# Patient Record
Sex: FEMALE
[De-identification: names, ages, dates, MRNs, and addresses within clinical notes are randomized per-mention and may not be internally consistent; named-entity substitution may affect disease eponyms.]

---

## 2019-03-21 ENCOUNTER — HOSPITAL ENCOUNTER (EMERGENCY)
Dept: HOSPITAL 31 - C.ER | Age: 49
LOS: 1 days | Discharge: HOME | End: 2019-03-22
Payer: MEDICAID

## 2019-03-21 VITALS — RESPIRATION RATE: 18 BRPM

## 2019-03-21 DIAGNOSIS — R10.32: Primary | ICD-10-CM

## 2019-03-21 PROCEDURE — 80358 DRUG SCREENING METHADONE: CPT

## 2019-03-21 PROCEDURE — 80353 DRUG SCREENING COCAINE: CPT

## 2019-03-21 PROCEDURE — 81025 URINE PREGNANCY TEST: CPT

## 2019-03-21 PROCEDURE — 81001 URINALYSIS AUTO W/SCOPE: CPT

## 2019-03-21 PROCEDURE — 85610 PROTHROMBIN TIME: CPT

## 2019-03-21 PROCEDURE — 85730 THROMBOPLASTIN TIME PARTIAL: CPT

## 2019-03-21 PROCEDURE — 80349 CANNABINOIDS NATURAL: CPT

## 2019-03-21 PROCEDURE — 85025 COMPLETE CBC W/AUTO DIFF WBC: CPT

## 2019-03-21 PROCEDURE — 74177 CT ABD & PELVIS W/CONTRAST: CPT

## 2019-03-21 PROCEDURE — 80346 BENZODIAZEPINES1-12: CPT

## 2019-03-21 PROCEDURE — 80345 DRUG SCREENING BARBITURATES: CPT

## 2019-03-21 PROCEDURE — 83992 ASSAY FOR PHENCYCLIDINE: CPT

## 2019-03-21 PROCEDURE — 99283 EMERGENCY DEPT VISIT LOW MDM: CPT

## 2019-03-21 PROCEDURE — 80053 COMPREHEN METABOLIC PANEL: CPT

## 2019-03-21 PROCEDURE — 80361 OPIATES 1 OR MORE: CPT

## 2019-03-21 PROCEDURE — 96374 THER/PROPH/DIAG INJ IV PUSH: CPT

## 2019-03-21 PROCEDURE — 84703 CHORIONIC GONADOTROPIN ASSAY: CPT

## 2019-03-21 PROCEDURE — 80324 DRUG SCREEN AMPHETAMINES 1/2: CPT

## 2019-03-22 ENCOUNTER — HOSPITAL ENCOUNTER (EMERGENCY)
Dept: HOSPITAL 14 - H.ER | Age: 49
Discharge: HOME | End: 2019-03-22
Payer: MEDICAID

## 2019-03-22 VITALS
SYSTOLIC BLOOD PRESSURE: 114 MMHG | OXYGEN SATURATION: 98 % | TEMPERATURE: 97.6 F | DIASTOLIC BLOOD PRESSURE: 70 MMHG | HEART RATE: 73 BPM

## 2019-03-22 VITALS
HEART RATE: 83 BPM | RESPIRATION RATE: 15 BRPM | SYSTOLIC BLOOD PRESSURE: 139 MMHG | DIASTOLIC BLOOD PRESSURE: 82 MMHG | OXYGEN SATURATION: 100 %

## 2019-03-22 VITALS — TEMPERATURE: 98.4 F

## 2019-03-22 DIAGNOSIS — Z88.1: ICD-10-CM

## 2019-03-22 DIAGNOSIS — I10: ICD-10-CM

## 2019-03-22 DIAGNOSIS — Z88.5: ICD-10-CM

## 2019-03-22 DIAGNOSIS — M79.81: Primary | ICD-10-CM

## 2019-03-22 DIAGNOSIS — N83.201: ICD-10-CM

## 2019-03-22 DIAGNOSIS — E87.6: ICD-10-CM

## 2019-03-22 DIAGNOSIS — Z88.8: ICD-10-CM

## 2019-03-22 LAB
ALBUMIN SERPL-MCNC: 4.4 G/DL (ref 3.5–5)
ALBUMIN SERPL-MCNC: 4.4 G/DL (ref 3.5–5)
ALBUMIN/GLOB SERPL: 1.5 {RATIO} (ref 1–2.1)
ALBUMIN/GLOB SERPL: 1.8 {RATIO} (ref 1–2.1)
ALT SERPL-CCNC: 17 U/L (ref 9–52)
ALT SERPL-CCNC: 30 U/L (ref 9–52)
APTT BLD: 29 SECONDS (ref 21–34)
APTT BLD: 34.4 SECONDS (ref 25.6–37.1)
AST SERPL-CCNC: 27 U/L (ref 14–36)
AST SERPL-CCNC: 27 U/L (ref 14–36)
BACTERIA #/AREA URNS HPF: (no result) /[HPF]
BASOPHILS # BLD AUTO: 0.1 K/UL (ref 0–0.2)
BASOPHILS # BLD AUTO: 0.1 K/UL (ref 0–0.2)
BASOPHILS NFR BLD: 0.7 % (ref 0–2)
BASOPHILS NFR BLD: 1 % (ref 0–2)
BILIRUB UR-MCNC: NEGATIVE MG/DL
BUN SERPL-MCNC: 11 MG/DL (ref 7–17)
BUN SERPL-MCNC: 19 MG/DL (ref 7–17)
CALCIUM SERPL-MCNC: 9.5 MG/DL (ref 8.4–10.2)
CALCIUM SERPL-MCNC: 9.5 MG/DL (ref 8.6–10.4)
EOSINOPHIL # BLD AUTO: 0 K/UL (ref 0–0.7)
EOSINOPHIL # BLD AUTO: 0.1 K/UL (ref 0–0.7)
EOSINOPHIL NFR BLD: 0.2 % (ref 0–4)
EOSINOPHIL NFR BLD: 1 % (ref 0–4)
ERYTHROCYTE [DISTWIDTH] IN BLOOD BY AUTOMATED COUNT: 13.9 % (ref 11.5–14.5)
ERYTHROCYTE [DISTWIDTH] IN BLOOD BY AUTOMATED COUNT: 14 % (ref 11.5–14.5)
GFR NON-AFRICAN AMERICAN: > 60
GFR NON-AFRICAN AMERICAN: > 60
GLUCOSE UR STRIP-MCNC: NORMAL MG/DL
HCG,QUALITATIVE URINE: NEGATIVE
HGB BLD-MCNC: 12.5 G/DL (ref 12–16)
HGB BLD-MCNC: 13 G/DL (ref 11–16)
HYALINE CASTS #/AREA URNS LPF: (no result) /LPF (ref 0–2)
INR PPP: 1
INR PPP: 1.1
LEUKOCYTE ESTERASE UR-ACNC: (no result) LEU/UL
LYMPHOCYTES # BLD AUTO: 2.1 K/UL (ref 1–4.3)
LYMPHOCYTES # BLD AUTO: 2.6 K/UL (ref 1–4.3)
LYMPHOCYTES NFR BLD AUTO: 23.1 % (ref 20–40)
LYMPHOCYTES NFR BLD AUTO: 23.9 % (ref 20–40)
MCH RBC QN AUTO: 30.6 PG (ref 27–31)
MCH RBC QN AUTO: 31.4 PG (ref 27–31)
MCHC RBC AUTO-ENTMCNC: 33.7 G/DL (ref 33–37)
MCHC RBC AUTO-ENTMCNC: 34.2 G/DL (ref 33–37)
MCV RBC AUTO: 90.8 FL (ref 81–99)
MCV RBC AUTO: 91.7 FL (ref 81–99)
MONOCYTES # BLD: 0.6 K/UL (ref 0–0.8)
MONOCYTES # BLD: 0.7 K/UL (ref 0–0.8)
MONOCYTES NFR BLD: 6.6 % (ref 0–10)
MONOCYTES NFR BLD: 6.8 % (ref 0–10)
NEUTROPHILS # BLD: 6.3 K/UL (ref 1.8–7)
NEUTROPHILS # BLD: 7.4 K/UL (ref 1.8–7)
NEUTROPHILS NFR BLD AUTO: 67.8 % (ref 50–75)
NEUTROPHILS NFR BLD AUTO: 68.9 % (ref 50–75)
NRBC BLD AUTO-RTO: 0 % (ref 0–0)
NRBC BLD AUTO-RTO: 0 % (ref 0–2)
PH UR STRIP: 6 [PH] (ref 5–8)
PLATELET # BLD: 242 K/UL (ref 130–400)
PLATELET # BLD: 258 K/UL (ref 130–400)
PMV BLD AUTO: 9.3 FL (ref 7.2–11.7)
PMV BLD AUTO: 9.7 FL (ref 7.2–11.7)
PROT UR STRIP-MCNC: NEGATIVE MG/DL
PROTHROMBIN TIME: 11.7 SECONDS (ref 9.8–13.1)
PROTHROMBIN TIME: 12 SECONDS (ref 9.7–12.2)
RBC # BLD AUTO: 4.09 MIL/UL (ref 3.8–5.2)
RBC # BLD AUTO: 4.13 MIL/UL (ref 3.8–5.2)
RBC # UR STRIP: NEGATIVE /UL
SP GR UR STRIP: 1.01 (ref 1–1.03)
SQUAMOUS EPITHIAL: 1 /HPF (ref 0–5)
UROBILINOGEN UR-MCNC: NORMAL MG/DL (ref 0.2–1)
WBC # BLD AUTO: 10.9 K/UL (ref 4.8–10.8)
WBC # BLD AUTO: 9.2 K/UL (ref 4.8–10.8)

## 2019-03-22 PROCEDURE — 85610 PROTHROMBIN TIME: CPT

## 2019-03-22 PROCEDURE — 76856 US EXAM PELVIC COMPLETE: CPT

## 2019-03-22 PROCEDURE — 81025 URINE PREGNANCY TEST: CPT

## 2019-03-22 PROCEDURE — 85730 THROMBOPLASTIN TIME PARTIAL: CPT

## 2019-03-22 PROCEDURE — 76882 US LMTD JT/FCL EVL NVASC XTR: CPT

## 2019-03-22 PROCEDURE — 80053 COMPREHEN METABOLIC PANEL: CPT

## 2019-03-22 PROCEDURE — 96374 THER/PROPH/DIAG INJ IV PUSH: CPT

## 2019-03-22 PROCEDURE — 99284 EMERGENCY DEPT VISIT MOD MDM: CPT

## 2019-03-22 PROCEDURE — 85025 COMPLETE CBC W/AUTO DIFF WBC: CPT

## 2019-03-22 NOTE — CT
Date of service: 



03/22/2019



PROCEDURE:  CT Abdomen and Pelvis with contrast



HISTORY:

LLQ PAIN and swelling r/o hernia



COMPARISON:

None available.



TECHNIQUE:

CT scan of the abdomen and pelvis was performed after administration 

of intravenous contrast. Oral contrast was administered.  Coronal and 

sagittal reformatted images were obtained.



Contrast dose: 100 mL Visipaque 320 



Radiation dose:



Total exam DLP = 848.47 mGy-cm.



This CT exam was performed using one or more of the following dose 

reduction techniques: Automated exposure control, adjustment of the 

mA and/or kV according to patient size, and/or use of iterative 

reconstruction technique.



FINDINGS:



LOWER THORAX:

The visualized lungs are clear. 



LIVER:

Mild hepatomegaly and fatty liver.  Normal homogeneous enhancement.  

No gross lesion or ductal dilatation. 



GALLBLADDER AND BILE DUCTS:

Surgically absent. 



PANCREAS:

Normal in size with homogeneous enhancement.  No gross lesion or 

ductal dilatation.



SPLEEN:

Normal in size and appearance. 



ADRENALS:

No discrete nodule. 



KIDNEYS AND URETERS:

Normal in size with homogeneous enhancement.  No hydronephrosis. No 

solid mass. 



VASCULATURE:

No aortic aneurysm.  There are no aortic atherosclerotic 

calcifications or mural plaque present. 



BOWEL:

The small bowel loops are normal in caliber.  Mild sigmoid 

diverticulosis without CT evidence for acute diverticulitis.  No 

bowel wall thickening or obstruction. 



APPENDIX:

Normal appendix. 



PERITONEUM:

No free fluid. No free air. 



LYMPH NODES:

No enlarged lymph nodes. 



BLADDER:

Partially decompressed. 



REPRODUCTIVE:

The uterus is normal in size. 



BONES:

No acute fracture.  Within normal limits for the patient's age. 



OTHER FINDINGS:

Mild subcutaneous edema/contusion in the left lower abdominal wall.



IMPRESSION:

No acute abdominal or pelvic abnormality.



Mild hepatomegaly and fatty liver. 



Mild sigmoid diverticulosis without CT evidence for acute 

diverticulitis. 



A preliminary report was provided by [employer].

## 2019-03-22 NOTE — C.PDOC
History Of Present Illness


49 year old female with PMHx of HTN presents to the ED c/o sudden onset left 

groin pain while showering tonight. Patient reports pain is associated with 

swelling and discoloration of the groin. Patient reports her LMP was last week a

nd it was normal. Patient has previous surgical history of 2 C-sections and 

cholecystectomy. Patient denies fever, chills, injury, heavy lifting, history of

the same, nausea, vomit, diarrhea, dysuria, hematuria, vaginal bleeding, vaginal

discharge.





Social history : smoker


Family history : HTN








Time Seen by Provider: 19 23:22


Chief Complaint (Nursing): Groin Pain


History Per: Patient


History/Exam Limitations: no limitations


Onset/Duration Of Symptoms: Sudden Onset


Current Symptoms Are (Timing): Still Present


Severity: Moderate


Location Of Pain/Discomfort: Other (Pelvic)


Radiation Of Pain To:: None


Quality Of Discomfort: "Pain"


Associated Symptoms: denies: Nausea, Vomiting, Diarrhea, Loss Of Appetite, 

Constipation, Urinary Symptoms


Recent travel outside of the [place]: No


Additional History Per: Patient


Abnormal Vaginal Bleeding: No


Last Menstral Period: last week





Past Medical History


Reviewed: Historical Data, Nursing Documentation, Vital Signs


Vital Signs: 





                                Last Vital Signs











Temp  98.4 F   19 23:22


 


Pulse  109 H  19 23:22


 


Resp  18   19 23:22


 


BP  156/95 H  19 23:22


 


Pulse Ox  99   19 23:22














- Medical History


PMH: Asthma, Gall Bladder Disease, HTN


   Denies: Chronic Kidney Disease


Surgical History: Cholecystectomy, 


Family History: States: Unknown Family Hx





- Social History


Hx Alcohol Use: No


Hx Substance Use: Yes





Review Of Systems


Constitutional: Negative for: Fever, Chills


Cardiovascular: Negative for: Chest Pain


Respiratory: Negative for: Shortness of Breath


Gastrointestinal: Negative for: Nausea, Vomiting, Abdominal Pain


Genitourinary: Positive for: Pelvic Pain.  Negative for: Dysuria, Hematuria, 

Vaginal Discharge, Vaginal Bleeding


Skin: Negative for: Rash


Neurological: Negative for: Weakness, Numbness





Physical Exam





- Physical Exam


Appears: Non-toxic, In Acute Distress (painful distress)


Skin: Warm, Dry


Head: Atraumatic, Normacephalic


Eye(s): bilateral: PERRL, EOMI


Oral Mucosa: Moist


Throat: No Erythema, No Exudate


Chest: Symmetrical, No Tenderness


Cardiovascular: Rhythm Regular, No Murmur


Respiratory: Normal Breath Sounds, No Wheezing


Gastrointestinal/Abdominal: Soft, Tenderness (left pelvic area raised 

ecchymotic, purpuric with exquisite tenderness with palpation at the site), No 

Distention, No Guarding, No Rebound, Other (obese)


Back: Normal Inspection, No Decreased ROM


Extremity: Normal ROM, No Deformity


Neurological/Psych: Oriented x3, Normal Motor, Normal Sensation





ED Course And Treatment





- Laboratory Results


Result Diagrams: 


                                 19 00:00





                                 19 00:00


O2 Sat by Pulse Oximetry: 99 (ON RA)


Pulse Ox Interpretation: Normal





Medical Decision Making


Medical Decision Making: 


Impression: acute left groin pain





Differentials include but not limited to : hernia, vasculitis, groin strain, 

abdominal wall strain, thrombophlebitis





Plan:


* CT abd/pelvis


* Labs


* IV fluids


* Toradol 15 mg IVP


* UA








Disposition





- Disposition


Disposition Time: 01:00


Condition: STABLE


Forms:  [facility] (English)





- Clinical Impression


Clinical Impression: 


 Abdominal pain








- Scribe Statement


The provider has reviewed the documentation as recorded by the Scribe


Mau Vera





All medical record entries made by the Scribe were at my direction and 

personally dictated by me. I have reviewed the chart and agree that the record 

accurately reflects my personal performance of the history, physical exam, 

medical decision making, and the department course for this patient. I have also

personally directed, reviewed, and agree with the discharge instructions and 

disposition.





Physician Patient Turnover


Patient Signed Over To: Malu Singh


Handoff Comments: Pending CT

## 2019-03-22 NOTE — ED PDOC
HPI: Female  Pain


Time Seen by Provider: 19 19:40


Chief Complaint (Nursing): Abdominal Pain


Chief Complaint (Provider): Inguinal Swelling


History Per: Patient


History/Exam Limitations: no limitations


Onset/Duration Of Symptoms: Hrs (x 24)


Current Symptoms Are (Timing): Still Present


Severity: Moderate


Quality Of Discomfort: "Pain"


Alleviating Factors: None


Additional Complaint(s): 


49 year old female with history of HTN, anxiety and depression presents to the 

ED for evaluation of left inguinal swelling for the last 24 hours. Patient 

states that yesterday she noticed swelling to the left inguinal area that she 

thought was possibly an ingrown hair. She was seen at [facility]. There she

had labs and a CT performed that were unremarkable and was ultimately discharged

with Naprosyn. Patient reports that swelling worsened overnight and is now 

accompanied by bruising. She ended her period four days ago and continues to 

experience vaginal spotting. The area is painful to the touch and pain is 

exacerbated by movement. Denies trauma and urinary symptoms. 





PMD: Dr. Korina Hurd 





Abnormal Vaginal Bleeding: Yes





Past Medical History


Reviewed: Historical Data (x 3), Nursing Documentation, Vital Signs


Vital Signs: 





                                Last Vital Signs











Temp  98.4 F   19 19:23


 


Pulse  103 H  19 19:23


 


Resp  20   19 19:23


 


BP  150/93 H  19 19:23


 


Pulse Ox  97   19 19:23














- Medical History


PMH: Asthma, Gall Bladder Disease, HTN


   Denies: Chronic Kidney Disease





- Surgical History


Surgical History: Cholecystectomy,  (x 3)





- Family History


Family History: States: Unknown Family Hx





- Social History


Current smoker - smoking cessation education provided: Yes (1/2 to 1 pack a day)





- Home Medications


Home Medications: 


                                Ambulatory Orders











 Medication  Instructions  Recorded


 


Losartan 1 tab PO DAILY 19


 


hydroCHLOROthiazide 1 tab PO DAILY 19


 


Lidocaine 5% [Lidoderm] 1 ea TD QAM #5 patch 19


 


Naproxen 375 mg PO BID PRN #20 tablet 19














- Allergies


Allergies/Adverse Reactions: 


                                    Allergies











Allergy/AdvReac Type Severity Reaction Status Date / Time


 


acetaminophen [From Percocet] Allergy  RASH Verified 19 19:19


 


diphenhydramine Allergy  RASH Verified 03/22/19 19:19





[From Benadryl]     


 


metronidazole [From Flagyl] Allergy  RASH Verified 19 19:19


 


morphine Allergy  RASH Verified 19 19:19


 


oxycodone [From Percocet] Allergy  RASH Verified 19 19:19














Review of Systems


ROS Statement: Except As Marked, All Systems Reviewed And Found Negative


Genitourinary Female: Positive for: Other (swelling and bruising to inguinal 

area)





Physical Exam





- Reviewed


Nursing Documentation Reviewed: Yes


Vital Signs Reviewed: Yes





- Physical Exam


Appears: Positive for: No Acute Distress


Head Exam: Positive for: ATRAUMATIC, NORMAL INSPECTION, NORMOCEPHALIC


Skin: Positive for: Normal Color, Warm, Dry.  Negative for: Rash


Eye Exam: Positive for: EOMI, Normal appearance, PERRL


Neck: Positive for: Normal, Painless ROM, Supple


Cardiovascular/Chest: Positive for: Regular Rate, Rhythm.  Negative for: Murmur


Respiratory: Positive for: Normal Breath Sounds.  Negative for: Respiratory Dis

tress


Gastrointestinal/Abdominal: Positive for: Normal Exam, Soft, Other (5 x 2 cm 

large, non-pulsatile, tender ecchymotic hematoma to left inguinal surface; no 

induration or warmth)


Back: Positive for: Normal Inspection.  Negative for: L CVA Tenderness, R CVA 

Tenderness


Extremity: Positive for: Normal ROM.  Negative for: Deformity


Neurological/Psych: Positive for: Awake, Alert, Normal Tone, Oriented.  Negative

for: Motor/Sensory Deficits





- Laboratory Results


Result Diagrams: 


                                 19 21:11





                                 19 21:11


Lab Results: 





                                        











PT  11.7 Seconds (9.8-13.1)   19  21:11    


 


INR  1.0   19  21:11    


 


APTT  34.4 Seconds (25.6-37.1)   19  21:11    








                                        











Total Bilirubin  0.2 mg/dl (0.2-1.3)   19  21:11    


 


AST  27 U/L (14-36)   19  21:11    


 


ALT  30 U/L (9-52)   19  21:11    


 


Alkaline Phosphatase  45 U/L ()   19  21:11    


 


Total Protein  7.2 G/DL (6.3-8.2)   19  21:11    


 


Albumin  4.4 g/dL (3.5-5.0)   19  21:11    


 


Globulin  2.9 gm/dL (2.2-3.9)   19  21:11    


 


Albumin/Globulin Ratio  1.5  (1.0-2.1)   19  21:11    














- ECG


O2 Sat by Pulse Oximetry: 97 (RA)


Pulse Ox Interpretation: Normal





Medical Decision Making


Medical Decision Makin:45 


Impression: 49 year old female with non-traumatic hematoma to left inguinal 

region


Initial Plan: 


--CBC 


--CMP


--PTT 


--PT 


--Urine dip 


--Urine preg 


--UA 


--Toradol 30 mg IM


--Transvag US 


--US soft tissue 





23:03


US Soft tissue


FINDINGS: 


No acute findings. No suspicious hypoechoic or hyperechoic foci are identified. 





IMPRESSION: 


1.  No acute findings. 


2.  No suspicious cystic or solid lesions detected.





23:04


US Transvag


FINDINGS: 





ENDOMETRIUM: 


Normal thickness measuring 8.5 mm in AP dimension. 





UTERUS/CERVIX: 


The uterus is elongated and slightly deviated to the right in position. The 

uterus measured approximately 12.6 x 3.8 x 4.4 cm in longitudinal, AP and 

transverse dimensions respectively. No uterine fibroid or other mass evident. 





RIGHT OVARY: 


There appears to be normal Doppler flow in transabdominal images. No abnormal 

mass. Incidental note is made of a 2.4 x 1.7 x 2.0 cm simple cyst. 





LEFT OVARY: 


There appears to be normal Doppler flow in transabdominal images. No abnormal 

mass. 





FREE FLUID: 


No free fluid. 





IMPRESSION: 


1.  Elongation of the uterus with deviation to the right. 


2.  2.4 x 1.7 cm simple cyst in the right ovary. 


3.  Otherwise, unremarkable transabdominal pelvic ultrasound.








23:20


Labs reviewed no clinically significant abnormalities. Patient reassured as to 

benign progression of hematoma and contusion. Patient advised to apply ice to 

the area and to sleep on her right side. Diagnosis is abdominal hematoma, 

contusion.








----------------------------------

---------------------------------------------------------------


Scribe Attestation:


Documented by Jeanie Lorenzo and Tigre Balbuena, acting as a scribe Rico Zavaleta MD 





Provider Scribe Attestation:


All medical record entries made by the Scribe were at my direction and 

personally dictated by me. I have reviewed the chart and agree that the record 

accurately reflects my personal performance of the history, physical exam, 

medical decision making, and the department course for this patient. I have also

personally directed, reviewed, and agree with the discharge instructions and 

disposition





Disposition





- Clinical Impression


Clinical Impression: 


 Hematoma and contusion, Ovarian cyst, Hypokalemia








- Disposition


Disposition: Routine/Home


Disposition Time: 23:20


Condition: STABLE


Additional Instructions: 





ELTON CHAVEZ, thank you for letting us take care of you today. Your provider 

was Kerwin Zavaleta MD and you were treated for LOWER ABD PAIN. The 

emergency medical care you received today was directed at your acute symptoms. 

If you were prescribed any medication, please fill it and take as directed. It 

may take several days for your symptoms to resolve. Return to the Emergency 

Department if your symptoms worsen, do not improve, or if you have any other 

problems.





Please contact your doctor or call one of the physicians/clinics you have been 

referred to that are listed on the Patient Visit Information form that is 

included in your discharge packet. Bring any paperwork you were given at d

ischarge with you along with any medications you are taking to your follow up 

visit. Our treatment cannot replace ongoing medical care by a primary care 

provider outside of the emergency department.





Thank you for allowing the [facility] team to be part of your care today.








If you had an X-Ray or CT scan: A Radiologist will review the ED reading if any 

change in treatment is needed we will contact you.***





If you had a blood, urine, or wound culture: It will take several days for the 

results, if any change in treatment is needed we will contact you.***





If you had an STI test: It will take 48 hours for the results. Please call after

1 week if you have not heard back.***


Prescriptions: 


Lidocaine 5% [Lidoderm] 1 ea TD QAM #5 patch


Instructions:  Ovarian Cysts, Contusion (DC)


Forms:  [facility] (English)

## 2019-03-23 NOTE — US
Date of service: 



03/22/2019



HISTORY:

Vaginal bleeding 



COMPARISON:

None available.



TECHNIQUE:

Transabdominal sonographic evaluation of the pelvis performed



FINDINGS:



UTERUS:

Measures 12.6 x3 0.8 x 4.4 cm.  Anteverted.  Normal in size and 

appearance. The fundus is somewhat inhomogeneous; the possibility of 

underlying fibroids not completely excluded 



ENDOMETRIUM:

Measures 9.0 mm in diameter. Unremarkable. 



CERVIX:

No cervical abnormality identified.



RIGHT OVARY:

Measures 3.2 x 2.2 x 2.7. cm. No solid mass. Normal flow. There is a 

simple appearing cyst right ovary measuring 2.4 x 1.7 x 2.0 cm



LEFT OVARY:

Measures 1.8 x 0.8 x 1.7 cm. No solid mass. Normal flow. 



FREE FLUID:

No significant free fluid noted.



OTHER FINDINGS:

None. 



IMPRESSION:

Small right ovarian cyst as described.  Uterus is somewhat 

inhomogeneous; the possibility of underlying fibroid not excluded.

## 2019-03-23 NOTE — US
Date of service: 



03/22/2019



PROCEDURE:  Left inguinal-suprapubic soft tissue ultrasound.



HISTORY:

Left groin hematoma 



COMPARISON:

No prior study available for comparison



TECHNIQUE:

Transabdominal sonographic evaluation soft tissues left inguinal and 

left suprapubic region performed. 



FINDINGS:

Current study reveals no evidence of discrete mass or fluid 

collection within the subcutaneous tissues.



IMPRESSION:

No discrete mass or fluid collections seen within the subcutaneous 

tissues in the left inguinal/left suprapubic region..